# Patient Record
Sex: FEMALE | Race: WHITE | Employment: UNEMPLOYED | URBAN - METROPOLITAN AREA
[De-identification: names, ages, dates, MRNs, and addresses within clinical notes are randomized per-mention and may not be internally consistent; named-entity substitution may affect disease eponyms.]

---

## 2017-02-04 ENCOUNTER — HOSPITAL ENCOUNTER (OUTPATIENT)
Age: 42
Discharge: NURSING, REHAB OR LONG-TERM CARE | End: 2017-02-21
Attending: PHYSICAL MEDICINE & REHABILITATION | Admitting: PHYSICAL MEDICINE & REHABILITATION

## 2017-02-04 DIAGNOSIS — G35 MULTIPLE SCLEROSIS (HCC): ICD-10-CM

## 2017-02-04 DIAGNOSIS — R53.82 CHRONIC FATIGUE: ICD-10-CM

## 2017-02-04 LAB
AMORPH CRY URNS QL MICRO: ABNORMAL
APPEARANCE UR: ABNORMAL
BACTERIA URNS QL MICRO: NEGATIVE /HPF
BILIRUB UR QL: NEGATIVE
COLOR UR: ABNORMAL
EPITH CASTS URNS QL MICRO: ABNORMAL /LPF
GLUCOSE UR STRIP.AUTO-MCNC: NEGATIVE MG/DL
HGB UR QL STRIP: ABNORMAL
KETONES UR QL STRIP.AUTO: NEGATIVE MG/DL
LEUKOCYTE ESTERASE UR QL STRIP.AUTO: NEGATIVE
NITRITE UR QL STRIP.AUTO: NEGATIVE
PH UR STRIP: 7 [PH] (ref 5–8)
PROT UR STRIP-MCNC: NEGATIVE MG/DL
RBC #/AREA URNS HPF: ABNORMAL /HPF (ref 0–5)
SP GR UR REFRACTOMETRY: 1.02 (ref 1–1.03)
UROBILINOGEN UR QL STRIP.AUTO: 2 EU/DL (ref 0.2–1)
WBC URNS QL MICRO: ABNORMAL /HPF (ref 0–4)

## 2017-02-04 PROCEDURE — 87086 URINE CULTURE/COLONY COUNT: CPT | Performed by: PHYSICAL MEDICINE & REHABILITATION

## 2017-02-04 PROCEDURE — 81001 URINALYSIS AUTO W/SCOPE: CPT | Performed by: PHYSICAL MEDICINE & REHABILITATION

## 2017-02-04 PROCEDURE — 74011250637 HC RX REV CODE- 250/637: Performed by: PHYSICAL MEDICINE & REHABILITATION

## 2017-02-04 RX ORDER — BACLOFEN 10 MG/1
10 TABLET ORAL 3 TIMES DAILY
Status: DISCONTINUED | OUTPATIENT
Start: 2017-02-04 | End: 2017-02-10

## 2017-02-04 RX ORDER — ADHESIVE BANDAGE
30 BANDAGE TOPICAL DAILY PRN
Status: DISCONTINUED | OUTPATIENT
Start: 2017-02-04 | End: 2017-02-21 | Stop reason: HOSPADM

## 2017-02-04 RX ORDER — ZOLPIDEM TARTRATE 5 MG/1
10 TABLET ORAL
Status: DISCONTINUED | OUTPATIENT
Start: 2017-02-04 | End: 2017-02-14

## 2017-02-04 RX ORDER — ALPRAZOLAM 0.5 MG/1
3 TABLET ORAL
Status: DISCONTINUED | OUTPATIENT
Start: 2017-02-04 | End: 2017-02-21 | Stop reason: HOSPADM

## 2017-02-04 RX ORDER — ACETAMINOPHEN 325 MG/1
650 TABLET ORAL
Status: DISCONTINUED | OUTPATIENT
Start: 2017-02-04 | End: 2017-02-21 | Stop reason: HOSPADM

## 2017-02-04 RX ORDER — ONDANSETRON 4 MG/1
4 TABLET, ORALLY DISINTEGRATING ORAL
Status: DISCONTINUED | OUTPATIENT
Start: 2017-02-04 | End: 2017-02-21 | Stop reason: HOSPADM

## 2017-02-04 RX ORDER — AMOXICILLIN 250 MG
1 CAPSULE ORAL
Status: DISCONTINUED | OUTPATIENT
Start: 2017-02-04 | End: 2017-02-05 | Stop reason: ALTCHOICE

## 2017-02-04 RX ORDER — DOCUSATE SODIUM 100 MG/1
100 CAPSULE, LIQUID FILLED ORAL 2 TIMES DAILY
Status: DISCONTINUED | OUTPATIENT
Start: 2017-02-05 | End: 2017-02-05 | Stop reason: ALTCHOICE

## 2017-02-04 RX ORDER — OXYCODONE HYDROCHLORIDE 5 MG/1
10 TABLET ORAL
Status: DISCONTINUED | OUTPATIENT
Start: 2017-02-04 | End: 2017-02-06

## 2017-02-04 RX ORDER — ZOLPIDEM TARTRATE 5 MG/1
10 TABLET ORAL
Status: DISCONTINUED | OUTPATIENT
Start: 2017-02-04 | End: 2017-02-04

## 2017-02-04 RX ORDER — POLYETHYLENE GLYCOL 3350 17 G/17G
17 POWDER, FOR SOLUTION ORAL DAILY
Status: DISCONTINUED | OUTPATIENT
Start: 2017-02-05 | End: 2017-02-06 | Stop reason: ALTCHOICE

## 2017-02-04 RX ADMIN — BACLOFEN 10 MG: 10 TABLET ORAL at 22:29

## 2017-02-04 RX ADMIN — ALPRAZOLAM 3 MG: 0.5 TABLET ORAL at 22:30

## 2017-02-04 RX ADMIN — ZOLPIDEM TARTRATE 10 MG: 5 TABLET, FILM COATED ORAL at 22:29

## 2017-02-04 RX ADMIN — OXYCODONE HYDROCHLORIDE 10 MG: 5 TABLET ORAL at 22:29

## 2017-02-05 VITALS — WEIGHT: 164 LBS | BODY MASS INDEX: 25.74 KG/M2 | HEIGHT: 67 IN

## 2017-02-05 LAB
ALBUMIN SERPL BCP-MCNC: 3.3 G/DL (ref 3.5–5)
ALBUMIN/GLOB SERPL: 1.1 {RATIO} (ref 1.1–2.2)
ALP SERPL-CCNC: 87 U/L (ref 45–117)
ALT SERPL-CCNC: 14 U/L (ref 12–78)
ANION GAP BLD CALC-SCNC: 9 MMOL/L (ref 5–15)
AST SERPL W P-5'-P-CCNC: 7 U/L (ref 15–37)
BILIRUB SERPL-MCNC: 0.3 MG/DL (ref 0.2–1)
BUN SERPL-MCNC: 12 MG/DL (ref 6–20)
BUN/CREAT SERPL: 20 (ref 12–20)
CALCIUM SERPL-MCNC: 8.5 MG/DL (ref 8.5–10.1)
CHLORIDE SERPL-SCNC: 108 MMOL/L (ref 97–108)
CO2 SERPL-SCNC: 27 MMOL/L (ref 21–32)
CREAT SERPL-MCNC: 0.6 MG/DL (ref 0.55–1.02)
ERYTHROCYTE [DISTWIDTH] IN BLOOD BY AUTOMATED COUNT: 13.4 % (ref 11.5–14.5)
GLOBULIN SER CALC-MCNC: 3 G/DL (ref 2–4)
GLUCOSE SERPL-MCNC: 89 MG/DL (ref 65–100)
HCT VFR BLD AUTO: 39.5 % (ref 35–47)
HGB BLD-MCNC: 13 G/DL (ref 11.5–16)
MCH RBC QN AUTO: 31.9 PG (ref 26–34)
MCHC RBC AUTO-ENTMCNC: 32.9 G/DL (ref 30–36.5)
MCV RBC AUTO: 97.1 FL (ref 80–99)
PLATELET # BLD AUTO: 212 K/UL (ref 150–400)
POTASSIUM SERPL-SCNC: 3.9 MMOL/L (ref 3.5–5.1)
PROT SERPL-MCNC: 6.3 G/DL (ref 6.4–8.2)
RBC # BLD AUTO: 4.07 M/UL (ref 3.8–5.2)
SODIUM SERPL-SCNC: 144 MMOL/L (ref 136–145)
WBC # BLD AUTO: 8.7 K/UL (ref 3.6–11)

## 2017-02-05 PROCEDURE — 74011250637 HC RX REV CODE- 250/637: Performed by: PHYSICAL MEDICINE & REHABILITATION

## 2017-02-05 PROCEDURE — 36415 COLL VENOUS BLD VENIPUNCTURE: CPT | Performed by: PHYSICAL MEDICINE & REHABILITATION

## 2017-02-05 PROCEDURE — 85027 COMPLETE CBC AUTOMATED: CPT | Performed by: PHYSICAL MEDICINE & REHABILITATION

## 2017-02-05 PROCEDURE — 80053 COMPREHEN METABOLIC PANEL: CPT | Performed by: PHYSICAL MEDICINE & REHABILITATION

## 2017-02-05 RX ORDER — MELATONIN
1000 2 TIMES DAILY
Status: DISCONTINUED | OUTPATIENT
Start: 2017-02-05 | End: 2017-02-21 | Stop reason: HOSPADM

## 2017-02-05 RX ORDER — ALPRAZOLAM 0.25 MG/1
0.25 TABLET ORAL
Status: DISCONTINUED | OUTPATIENT
Start: 2017-02-05 | End: 2017-02-07

## 2017-02-05 RX ORDER — IBUPROFEN 200 MG
1 TABLET ORAL DAILY
Status: DISCONTINUED | OUTPATIENT
Start: 2017-02-05 | End: 2017-02-21 | Stop reason: HOSPADM

## 2017-02-05 RX ORDER — FLUOCINONIDE 0.5 MG/G
CREAM TOPICAL 3 TIMES DAILY
Status: DISCONTINUED | OUTPATIENT
Start: 2017-02-05 | End: 2017-02-15

## 2017-02-05 RX ADMIN — FLUOCINONIDE: 0.5 CREAM TOPICAL at 22:00

## 2017-02-05 RX ADMIN — BACLOFEN 10 MG: 10 TABLET ORAL at 05:46

## 2017-02-05 RX ADMIN — OXYCODONE HYDROCHLORIDE 10 MG: 5 TABLET ORAL at 18:30

## 2017-02-05 RX ADMIN — ALPRAZOLAM 0.25 MG: 0.25 TABLET ORAL at 16:39

## 2017-02-05 RX ADMIN — OXYCODONE HYDROCHLORIDE 10 MG: 5 TABLET ORAL at 11:39

## 2017-02-05 RX ADMIN — BACLOFEN 10 MG: 10 TABLET ORAL at 22:00

## 2017-02-05 RX ADMIN — BACLOFEN 10 MG: 10 TABLET ORAL at 12:53

## 2017-02-05 RX ADMIN — ALPRAZOLAM 3 MG: 0.5 TABLET ORAL at 22:04

## 2017-02-05 RX ADMIN — ZOLPIDEM TARTRATE 10 MG: 5 TABLET, FILM COATED ORAL at 23:39

## 2017-02-05 RX ADMIN — OXYCODONE HYDROCHLORIDE 10 MG: 5 TABLET ORAL at 05:46

## 2017-02-05 RX ADMIN — CHOLECALCIFEROL TAB 25 MCG (1000 UNIT) 1000 UNITS: 25 TAB at 16:40

## 2017-02-05 RX ADMIN — OXYCODONE HYDROCHLORIDE 10 MG: 5 TABLET ORAL at 23:39

## 2017-02-05 RX ADMIN — FLUOCINONIDE: 0.5 CREAM TOPICAL at 16:40

## 2017-02-06 ENCOUNTER — HOSPITAL ENCOUNTER (OUTPATIENT)
Dept: MRI IMAGING | Age: 42
End: 2017-02-06
Attending: PHYSICAL MEDICINE & REHABILITATION
Payer: COMMERCIAL

## 2017-02-06 ENCOUNTER — HOSPITAL ENCOUNTER (OUTPATIENT)
Dept: MRI IMAGING | Age: 42
Discharge: HOME | End: 2017-02-06
Attending: PHYSICAL MEDICINE & REHABILITATION
Payer: COMMERCIAL

## 2017-02-06 LAB
BACTERIA SPEC CULT: NORMAL
CC UR VC: NORMAL
SERVICE CMNT-IMP: NORMAL

## 2017-02-06 PROCEDURE — A9585 GADOBUTROL INJECTION: HCPCS | Performed by: PHYSICAL MEDICINE & REHABILITATION

## 2017-02-06 PROCEDURE — 74011250637 HC RX REV CODE- 250/637: Performed by: PHYSICAL MEDICINE & REHABILITATION

## 2017-02-06 PROCEDURE — 72157 MRI CHEST SPINE W/O & W/DYE: CPT

## 2017-02-06 PROCEDURE — 72156 MRI NECK SPINE W/O & W/DYE: CPT

## 2017-02-06 PROCEDURE — 70553 MRI BRAIN STEM W/O & W/DYE: CPT

## 2017-02-06 PROCEDURE — 74011250636 HC RX REV CODE- 250/636: Performed by: PHYSICAL MEDICINE & REHABILITATION

## 2017-02-06 RX ORDER — OXYCODONE HYDROCHLORIDE 5 MG/1
10 TABLET ORAL
Status: DISCONTINUED | OUTPATIENT
Start: 2017-02-06 | End: 2017-02-21 | Stop reason: HOSPADM

## 2017-02-06 RX ADMIN — CHOLECALCIFEROL TAB 25 MCG (1000 UNIT) 1000 UNITS: 25 TAB at 09:12

## 2017-02-06 RX ADMIN — BACLOFEN 10 MG: 10 TABLET ORAL at 06:10

## 2017-02-06 RX ADMIN — BACLOFEN 10 MG: 10 TABLET ORAL at 21:08

## 2017-02-06 RX ADMIN — FLUOCINONIDE: 0.5 CREAM TOPICAL at 06:10

## 2017-02-06 RX ADMIN — ALPRAZOLAM 0.25 MG: 0.25 TABLET ORAL at 12:29

## 2017-02-06 RX ADMIN — OXYCODONE HYDROCHLORIDE 10 MG: 5 TABLET ORAL at 12:28

## 2017-02-06 RX ADMIN — OXYCODONE HYDROCHLORIDE 10 MG: 5 TABLET ORAL at 17:16

## 2017-02-06 RX ADMIN — GADOBUTROL 10 ML: 604.72 INJECTION INTRAVENOUS at 20:18

## 2017-02-06 RX ADMIN — OXYCODONE HYDROCHLORIDE 10 MG: 5 TABLET ORAL at 22:53

## 2017-02-06 RX ADMIN — BACLOFEN 10 MG: 10 TABLET ORAL at 13:14

## 2017-02-06 RX ADMIN — FLUOCINONIDE: 0.5 CREAM TOPICAL at 13:16

## 2017-02-06 RX ADMIN — CHOLECALCIFEROL TAB 25 MCG (1000 UNIT) 1000 UNITS: 25 TAB at 17:16

## 2017-02-06 RX ADMIN — ALPRAZOLAM 3 MG: 0.5 TABLET ORAL at 21:08

## 2017-02-06 RX ADMIN — FLUOCINONIDE: 0.5 CREAM TOPICAL at 22:00

## 2017-02-06 RX ADMIN — OXYCODONE HYDROCHLORIDE 10 MG: 5 TABLET ORAL at 06:10

## 2017-02-06 RX ADMIN — ZOLPIDEM TARTRATE 10 MG: 5 TABLET, FILM COATED ORAL at 22:00

## 2017-02-06 RX ADMIN — ALPRAZOLAM 0.25 MG: 0.25 TABLET ORAL at 18:53

## 2017-02-06 NOTE — CONSULTS
NEUROLOGY CONSULTATION NOTE     DATE OF CONSULTATION: 2/6/2017    CONSULTED BY: Volodymyr Fonseca MD    Reason for Consult  I have been asked to see the patient in neurological consultation to render advice and opinion regarding multiple sclerosis. HISTORY OF PRESENT ILLNESS  Noemy Gonsalez is a 42 y.o. female who presents to the hospital because of multiple sclerosis. The patient was diagnosed with MS at the age of 31 when she did have bilateral optic neuritis and left sided weakness. The symptoms resolved and were under relatively good control till 2012, w hen she started having increased stumbling and progressive weakness in the legs. She has been on avonex then transitioned to tysabri and for the last 4-5 yrs has been on tecfidera. Tysabri was d/c because of JOON positive status. She does also c/o fatigue. ROS  A ten system review of constitutional, cardiovascular, respiratory, musculoskeletal, endocrine, skin, SHEENT, genitourinary, psychiatric and neurologic systems was obtained and is unremarkable except as stated in HPI     PMH  Past Medical History   Diagnosis Date    Chronic pain     MS (multiple sclerosis) ([facility] [address]        FH  No family history on file. SH  Social History     Social History    Marital status: SINGLE     Spouse name: N/A    Number of children: N/A    Years of education: N/A     Social History Main Topics    Smoking status: Current Every Day Smoker     Packs/day: 0.50     Years: 19.00    Smokeless tobacco: Not on file    Alcohol use No    Drug use: No    Sexual activity: Yes     Partners: Male     Other Topics Concern    Not on file     Social History Narrative    No narrative on file       ALLERGIES  No Known Allergies    PHYSICAL EXAM  EXAMINATION:   No data found. General:   General appearance: Pt is in no acute distress   Distal pulses are preserved  Fundoscopic exam: attempted    Neurological Examination:   Mental Status:  AAO x3. Speech is fluent.  Follows commands, has normal fund of knowledge, attention, short term recall, comprehension and insight. Cranial Nerves: Visual fields are full. PERRL, Extraocular movements are full. Facial sensation intact V1- V3. Facial movement intact, symmetric. Hearing intact to conversation. Palate elevates symmetrically. Shoulder shrug symmetric. Tongue midline. Motor: Strength is 5/5 in UE and 4-/5 in proximal lower ext bilaterally and left foot drop. Normal tone. No atrophy. Sensation: Normal to light touch    Reflexes: DTRs 3+ throughout. Coordination/Cerebellar: Intact to finger-nose-finger     Gait: pt walks with a walker. Skin: No significant bruising or lacerations.     LAB DATA REVIEWED:    Results for orders placed or performed during the hospital encounter of 02/04/17   CULTURE, URINE   Result Value Ref Range    Special Requests: NO SPECIAL REQUESTS      [place] Count <10,000  COLONIES/mL        Culture result: NO SIGNIFICANT GROWTH     URINALYSIS W/MICROSCOPIC   Result Value Ref Range    Color YELLOW/STRAW      Appearance CLOUDY (A) CLEAR      Specific gravity 1.020 1.003 - 1.030      pH (UA) 7.0 5.0 - 8.0      Protein NEGATIVE  NEG mg/dL    Glucose NEGATIVE  NEG mg/dL    Ketone NEGATIVE  NEG mg/dL    Bilirubin NEGATIVE  NEG      Blood TRACE (A) NEG      Urobilinogen 2.0 (H) 0.2 - 1.0 EU/dL    Nitrites NEGATIVE  NEG      Leukocyte Esterase NEGATIVE  NEG      WBC 0-4 0 - 4 /hpf    RBC 5-10 0 - 5 /hpf    Epithelial cells FEW FEW /lpf    Bacteria NEGATIVE  NEG /hpf    Amorphous Crystals 2+ (A) NEG   METABOLIC PANEL, COMPREHENSIVE   Result Value Ref Range    Sodium 144 136 - 145 mmol/L    Potassium 3.9 3.5 - 5.1 mmol/L    Chloride 108 97 - 108 mmol/L    CO2 27 21 - 32 mmol/L    Anion gap 9 5 - 15 mmol/L    Glucose 89 65 - 100 mg/dL    BUN 12 6 - 20 MG/DL    Creatinine 0.60 0.55 - 1.02 MG/DL    BUN/Creatinine ratio 20 12 - 20      GFR est AA >60 >60 ml/min/1.73m2    GFR est non-AA >60 >60 ml/min/1.73m2 Calcium 8.5 8.5 - 10.1 MG/DL    Bilirubin, total 0.3 0.2 - 1.0 MG/DL    ALT (SGPT) 14 12 - 78 U/L    AST (SGOT) 7 (L) 15 - 37 U/L    Alk. phosphatase 87 45 - 117 U/L    Protein, total 6.3 (L) 6.4 - 8.2 g/dL    Albumin 3.3 (L) 3.5 - 5.0 g/dL    Globulin 3.0 2.0 - 4.0 g/dL    A-G Ratio 1.1 1.1 - 2.2     CBC W/O DIFF   Result Value Ref Range    WBC 8.7 3.6 - 11.0 K/uL    RBC 4.07 3.80 - 5.20 M/uL    HGB 13.0 11.5 - 16.0 g/dL    HCT 39.5 35.0 - 47.0 %    MCV 97.1 80.0 - 99.0 FL    MCH 31.9 26.0 - 34.0 PG    MCHC 32.9 30.0 - 36.5 g/dL    RDW 13.4 11.5 - 14.5 %    PLATELET 867 967 - 821 K/uL        Imaging review:  None    HOME MEDS  Prior to Admission Medications   Prescriptions Last Dose Informant Patient Reported? Taking? ALPRAZolam (XANAX) 1 mg tablet   Yes No   Sig: Take 1 mg by mouth nightly. dimethyl fumarate (TECFIDERA) 120 mg cpDR   Yes No   Sig: Take 240 mg by mouth two (2) times a day.   ketorolac (TORADOL) 10 mg tablet   No No   Sig: Take 1 Tab by mouth every six (6) hours as needed for Pain. nitrofurantoin, macrocrystal-monohydrate, (MACROBID) 100 mg capsule   No No   Sig: Take 1 Cap by mouth two (2) times a day. As needed   oxyCODONE IR (ROXICODONE) 5 mg immediate release tablet   Yes No   Sig: Take 10 mg by mouth every four (4) hours as needed. phenazopyridine (PYRIDIUM) 200 mg tablet   No No   Sig: Take 1 Tab by mouth three (3) times daily as needed for Pain. sertraline (ZOLOFT) 50 mg tablet   Yes No   Sig: Take  by mouth daily.       Facility-Administered Medications: None       CURRENT MEDS  Current Facility-Administered Medications   Medication Dose Route Frequency    nicotine (NICODERM CQ) 14 mg/24 hr patch 1 Patch  1 Patch TransDERmal DAILY    cholecalciferol (VITAMIN D3) tablet 1,000 Units  1,000 Units Oral BID    fluocinoNIDE (LIDEX) 0.05 % cream   Topical TID    influenza vaccine 2016-17 (36mos+)(PF) (FLUZONE/FLUARIX/FLULAVAL QUAD) injection 0.5 mL  0.5 mL IntraMUSCular PRIOR TO DISCHARGE    baclofen (LIORESAL) tablet 10 mg  10 mg Oral TID    ALPRAZolam (XANAX) tablet 3 mg  3 mg Oral QHS    polyethylene glycol (MIRALAX) packet 17 g  17 g Oral DAILY    zolpidem (AMBIEN) tablet 10 mg  10 mg Oral QHS       IMPRESSION:  Gavi Richards is a 42 y.o. female who is in rehab because of increased weakness in the legs. She has been on avonex, tysabri and is presently on tecfidera at home. She is not taking tecfidera for a month. She wanted to know other treatment options. RECOMMENDATIONS:  - Will get MRI of the brain, C and T spine with and without contrast to get a new baseline  - I discussed with her about other medications including gilenya, augabio and new meds such as lemtrada. - Cannot start these medications while at rehab and have asked patient to make a follow up appt either at our office or she has the option of going to a MS center such as [facility]    Please call when the MRI is done so that I can review it. Thank you very much for this consultation. We will follow up on the above studies and give further recommendations as indicated.       Shadia Salgado MD  Diplomate, American Board of Psychiatry & Neurology (Neurology)  [place] Board of Psychiatry & Neurology (Clinical Neurophysiology)

## 2017-02-07 PROCEDURE — 74011250637 HC RX REV CODE- 250/637: Performed by: PHYSICAL MEDICINE & REHABILITATION

## 2017-02-07 RX ORDER — ALPRAZOLAM 0.5 MG/1
0.5 TABLET ORAL
Status: DISCONTINUED | OUTPATIENT
Start: 2017-02-07 | End: 2017-02-21 | Stop reason: HOSPADM

## 2017-02-07 RX ADMIN — FLUOCINONIDE: 0.5 CREAM TOPICAL at 21:29

## 2017-02-07 RX ADMIN — CHOLECALCIFEROL TAB 25 MCG (1000 UNIT) 1000 UNITS: 25 TAB at 08:35

## 2017-02-07 RX ADMIN — ALPRAZOLAM 0.25 MG: 0.25 TABLET ORAL at 11:27

## 2017-02-07 RX ADMIN — OXYCODONE HYDROCHLORIDE 10 MG: 5 TABLET ORAL at 05:20

## 2017-02-07 RX ADMIN — BACLOFEN 10 MG: 10 TABLET ORAL at 05:20

## 2017-02-07 RX ADMIN — OXYCODONE HYDROCHLORIDE 10 MG: 5 TABLET ORAL at 11:28

## 2017-02-07 RX ADMIN — ALPRAZOLAM 3 MG: 0.5 TABLET ORAL at 23:19

## 2017-02-07 RX ADMIN — FLUOCINONIDE: 0.5 CREAM TOPICAL at 05:23

## 2017-02-07 RX ADMIN — OXYCODONE HYDROCHLORIDE 10 MG: 5 TABLET ORAL at 17:09

## 2017-02-07 RX ADMIN — OXYCODONE HYDROCHLORIDE 10 MG: 5 TABLET ORAL at 08:41

## 2017-02-07 RX ADMIN — BACLOFEN 10 MG: 10 TABLET ORAL at 21:29

## 2017-02-07 RX ADMIN — OXYCODONE HYDROCHLORIDE 10 MG: 5 TABLET ORAL at 21:26

## 2017-02-07 RX ADMIN — OXYCODONE HYDROCHLORIDE 10 MG: 5 TABLET ORAL at 14:28

## 2017-02-07 RX ADMIN — CHOLECALCIFEROL TAB 25 MCG (1000 UNIT) 1000 UNITS: 25 TAB at 17:09

## 2017-02-07 RX ADMIN — BACLOFEN 10 MG: 10 TABLET ORAL at 14:28

## 2017-02-07 RX ADMIN — FLUOCINONIDE: 0.5 CREAM TOPICAL at 17:10

## 2017-02-07 RX ADMIN — ZOLPIDEM TARTRATE 10 MG: 5 TABLET, FILM COATED ORAL at 23:18

## 2017-02-08 PROCEDURE — 74011250637 HC RX REV CODE- 250/637: Performed by: PHYSICAL MEDICINE & REHABILITATION

## 2017-02-08 PROCEDURE — 36415 COLL VENOUS BLD VENIPUNCTURE: CPT | Performed by: PHYSICAL MEDICINE & REHABILITATION

## 2017-02-08 PROCEDURE — 74011000258 HC RX REV CODE- 258: Performed by: PHYSICAL MEDICINE & REHABILITATION

## 2017-02-08 PROCEDURE — 74011250636 HC RX REV CODE- 250/636: Performed by: PHYSICAL MEDICINE & REHABILITATION

## 2017-02-08 PROCEDURE — 83520 IMMUNOASSAY QUANT NOS NONAB: CPT | Performed by: PHYSICAL MEDICINE & REHABILITATION

## 2017-02-08 RX ORDER — FAMOTIDINE 20 MG/1
20 TABLET, FILM COATED ORAL 2 TIMES DAILY
Status: DISCONTINUED | OUTPATIENT
Start: 2017-02-08 | End: 2017-02-19

## 2017-02-08 RX ORDER — SODIUM CHLORIDE 0.9 % (FLUSH) 0.9 %
5 SYRINGE (ML) INJECTION EVERY 8 HOURS
Status: DISCONTINUED | OUTPATIENT
Start: 2017-02-08 | End: 2017-02-15

## 2017-02-08 RX ORDER — SODIUM CHLORIDE 0.9 % (FLUSH) 0.9 %
10 SYRINGE (ML) INJECTION AS NEEDED
Status: DISCONTINUED | OUTPATIENT
Start: 2017-02-08 | End: 2017-02-15

## 2017-02-08 RX ADMIN — ALPRAZOLAM 0.5 MG: 0.5 TABLET ORAL at 09:01

## 2017-02-08 RX ADMIN — OXYCODONE HYDROCHLORIDE 10 MG: 5 TABLET ORAL at 21:03

## 2017-02-08 RX ADMIN — OXYCODONE HYDROCHLORIDE 10 MG: 5 TABLET ORAL at 16:29

## 2017-02-08 RX ADMIN — BACLOFEN 10 MG: 10 TABLET ORAL at 14:22

## 2017-02-08 RX ADMIN — BACLOFEN 10 MG: 10 TABLET ORAL at 04:28

## 2017-02-08 RX ADMIN — ALPRAZOLAM 0.5 MG: 0.5 TABLET ORAL at 16:49

## 2017-02-08 RX ADMIN — OXYCODONE HYDROCHLORIDE 10 MG: 5 TABLET ORAL at 04:28

## 2017-02-08 RX ADMIN — OXYCODONE HYDROCHLORIDE 10 MG: 5 TABLET ORAL at 08:48

## 2017-02-08 RX ADMIN — CHOLECALCIFEROL TAB 25 MCG (1000 UNIT) 1000 UNITS: 25 TAB at 08:51

## 2017-02-08 RX ADMIN — FAMOTIDINE 20 MG: 20 TABLET ORAL at 19:20

## 2017-02-08 RX ADMIN — Medication 5 ML: at 22:53

## 2017-02-08 RX ADMIN — SODIUM CHLORIDE 1000 MG: 900 INJECTION, SOLUTION INTRAVENOUS at 18:38

## 2017-02-08 RX ADMIN — ZOLPIDEM TARTRATE 10 MG: 5 TABLET, FILM COATED ORAL at 22:52

## 2017-02-08 RX ADMIN — FLUOCINONIDE: 0.5 CREAM TOPICAL at 22:53

## 2017-02-08 RX ADMIN — FLUOCINONIDE: 0.5 CREAM TOPICAL at 04:30

## 2017-02-08 RX ADMIN — BACLOFEN 10 MG: 10 TABLET ORAL at 21:03

## 2017-02-08 RX ADMIN — ALPRAZOLAM 3 MG: 0.5 TABLET ORAL at 22:52

## 2017-02-08 RX ADMIN — OXYCODONE HYDROCHLORIDE 10 MG: 5 TABLET ORAL at 12:25

## 2017-02-08 RX ADMIN — CHOLECALCIFEROL TAB 25 MCG (1000 UNIT) 1000 UNITS: 25 TAB at 16:31

## 2017-02-08 NOTE — H&P
DATE OF POST ADMISSION PHYSICIAN EVALUATION:  02/05/2017 at 08:00 a.m. ADMITTING PHYSICIAN:  Triny Mancia M.D. NEUROLOGY AND PRIMARY CARE:  Sarah Key M.D. REFERRING PHYSICIAN:  Savi Hernandez M.D. REASON FOR INTENSIVE INPATIENT REHABILITATION and PRIMARY DIAGNOSIS:  Multiple sclerosis exacerbation. HISTORY OF PRESENT ILLNESS:  Ms. Jocelin Dickinson is a 54-year-old female with multiple sclerosis, complaining of several falls in the last few months. Most recently, she had a fall last week where she states she hit her head on a dresser. She was at a hotel in a wheelchair-accessible room, but did not have a shower chair and had a hard time transferring out of the shower. She states that she had her last exacerbation in the summer of 2016 and had her latest home health physical therapies in the fall of 2016. She does have a history of relapsing and remitting MS since 2000; however, has noticed worsening strength, balance, and some ataxia. At home, she is able to eat independently, but is not able to stand for a long period of time, she needs to sit in order to dress herself. She has trouble getting in and out of the shower by herself. She is unable to drive. She states she has been told by home health therapist that she needs more intensive rehabilitation and has had trouble making it to outpatient therapies due to lack of transportation. She is admitted to [facility] for intensive inpt rehab    PAST MEDICAL HISTORY:  1. Depression and anxiety. 2. Multiple sclerosis, relapsing and remitting, since 2000 -on tecfidera  3. Chronic MS pain. 4. Endometriosis. PAST SURGICAL HISTORY:  1. Status post colon biopsy. 2. Status post laparoscopy for endometriosis in 2001. 3. Orthopedic right knee procedure. SOCIAL HISTORY:  The patient is currently on disability. She lives with her fiance. She ambulates with a Rollator for the last 2 years.   Prior to that, she used to ambulate with a single-point cane. MEDICATIONS:  1. Baclofen 10 mg 3 times daily. 2. Alprazolam 3 mg orally at bedtime as needed. 3. Ambien extended release 12.5 mg orally at bedtime. 4. Zoloft - the patient not currently taking. 5. Oxycodone 5 mg 2 tablets 4 times daily, prescribed by Dr. Magda Bell. 6. Tecfidera 240 mg twice a day for MS. REVIEW OF SYSTEMS:  EYES:  Positive for blurred vision. CONSTITUTIONAL:  Positive for generalized weakness. IADLs:  Modified independent to minimal assistance. MOBILITY:  Modified independent with Rollator with several falls. ENT:  Denies dysphagia. CARDIOVASCULAR:  Denies chest pain. RESPIRATORY:  Denies shortness of breath. GI:  Denies constipation. :  Bladder, denies urgency. MUSCULOSKELETAL:  Denies joint swelling. SKIN:  Positive for eczema rash. NEUROLOGIC:  Denies spasms. PSYCHIATRIC:  Depressed mood. Not taking Zoloft currently. ENDOCRINE:  Denied cold intolerance. HEME:  Positive for easy bruising. PHYSICAL EXAMINATION:  VITAL SIGNS:  Pulse is 88, blood pressure 110/73, weight 156, height 5 feet 7 inches, and BMI 24.4. GENERAL:  Habitus was mesomorphic and groomed. EYES:  She does have some strabismus of the right eye. NECK:  Normal.  CARDIOVASCULAR:  Normal.  GI:  Abdomen was soft. PSYCHIATRIC:  Occasionally poor memory and perseverative. SKIN:  The patient had a lot of bruising, scarring, and abrasions from falls. NEUROLOGIC:  Sensation was symmetrical in dermatomes around L2, L3, and L4. Reflexes were 3+ at bilateral knees and ankles. Her gait was mildly ataxic with the Rollator, occasionally unable to abduct the left lower extremity and somewhat dragging the right lower extremity. On the left side, hip flexion was 2/5, knee extension was 4/5, ankle plantar flexion 3/5, and dorsiflexion 2/5. On the right side, hip flexion was 4/5, knee extension 5/5, ankle plantar flexion 4/5, and dorsiflexion 4/5.      IMPRESSION:  Functional and mobility deficits progressing and worsening since last multiple sclerosis exacerbation in 11/2016. ASSESSMENT:  1. This patient would benefit from short stay intensive inpatient rehabilitation to address strengthening and balance to maximize independence and to prevent falls. She would most likely benefit from physical therapy, occupational therapy, as well as speech therapy for cognition. 2. For anxiety and depression, the patient is often limited by her mood in terms of function. She may benefit from resuming an antidepressant as well as medical psychology for coping skills and mechanisms. 3. For multiple sclerosis, she is declining  Tecfidera. Consider Neuro consult re other MS disease modifying agents  4. Anxiety with alprazolam as needed. Med Psych eval. Consider other SSRI. Milka Noel Milka Noel Milka Noel Paxil or Lexapro  5. Spasticity with baclofen at home dose. <INITIAL REHABILITATION PLAN OF CARE:>Individual plan of care has been developed in accordance with therapy notes and review of the preadmission assessment. <REHABILITATION POTENTIAL & GOALS:>Rehabilitation potential is good to make overall improvements and achieve functional goal of modified independent. <ESTIMATED LENGTH OF STAY:>2-3wks. <ANTICIPATED DISCHARGE DISPOSITION:>Home with close family support. <PRECAUTIONS AND RESTRICTIONS:>Fall. <POST INPATIENT REHABILITATION PLAN:> OP PT and OT.    <POST INPATIENT MEDICAL FOLLOWUP:>PCP, Neurology, Rehab Medicine. <SPECIFIC REHABILITATION NEEDS:>  1. PT 1 to 2 hours a day, 6 days a week to improve bed mobility, gait, functional transfers, mobility, and strengthening. 2. OT 1 to 2 hours a day, 6 days a week to improve basic self-care and functional transfers, adaptive equipment procurement and training. 3. Nursing 24-hours a day by a nurse specialized in rehabilitation for medication administration, skin infection prevention, wound care, bowel and bladder management, pain control, and vital signs monitoring.   4. Case management 1 hour a day, 3 days a week for discharge planning and team coordination. <POST-ADMISSION PHYSICIAN EVALUATION:>Due to the rigors of the rehabilitation program and methods to monitor, prevent, and treat them are noted in the comorbidities. <COMPARISON TO PREADMISSION SCREENING:>The patient's current function and medical condition remains essentially the same. <SUMMARY STATEMENTS:>An IRF setting is deemed the most appropriate level of care for this patient based upon the needs of the above noted conditions and co-morbidities. 1. This patient requires at least 3 hours daily therapies at least 5 days per week (or a minimum of 15 hours/week) to be provided continuously, increase endurance and reinforce learning of adaptive techniques due to a combination of functional deficits and co-morbidities noted above. 2. In addition, coordinated weekly team meetings will be utilized throughout this patients rehab stay, to review progress, identify problems and determine solutions to these barriers within previously set goals.   Revisions to goals and care plan will be discussed whenever necessary with physician, nursing, and therapy input.             <CC:>      <DICTATION DATE/TIME:> 02/08/2017 07:22:32  <TRANSCRIPTION DATE/TIME:> 02/08/2017 08:10:34

## 2017-02-08 NOTE — PROGRESS NOTES
NEUROLOGY FOLLOW UP NOTE    SUBJECTIVE   - MRI done  - No overnight c/o    HISTORY OF PRESENT ILLNESS  Lacie Claude is a 42 y.o. female who presents to the hospital because of multiple sclerosis. The patient was diagnosed with MS at the age of 31 when she did have bilateral optic neuritis and left sided weakness. The symptoms resolved and were under relatively good control till 2012, w hen she started having increased stumbling and progressive weakness in the legs. She has been on avonex then transitioned to tysabri and for the last 4-5 yrs has been on tecfidera. Tysabri was d/c because of JOON positive status. She does also c/o fatigue. ROS  A ten system review of constitutional, cardiovascular, respiratory, musculoskeletal, endocrine, skin, SHEENT, genitourinary, psychiatric and neurologic systems was obtained and is unremarkable except as stated in HPI     PHYSICAL EXAM  EXAMINATION:   General:   General appearance: Pt is in no acute distress   Distal pulses are preserved    Neurological Examination:   Mental Status:  AAO x3. Speech is fluent. Follows commands, has normal fund of knowledge, attention, short term recall, comprehension and insight. Cranial Nerves: Visual fields are full. PERRL, Extraocular movements are full. Facial sensation intact V1- V3. Facial movement intact, symmetric. Hearing intact to conversation. Palate elevates symmetrically. Shoulder shrug symmetric. Tongue midline. Motor: Strength is 5/5 in UE and 4-/5 in proximal lower ext bilaterally and left foot drop. Normal tone. No atrophy. Sensation: Normal to light touch      Gait: pt walks with a walker. Skin: No significant bruising or lacerations.     LAB DATA REVIEWED:    Results for orders placed or performed during the hospital encounter of 02/04/17   CULTURE, URINE   Result Value Ref Range    Special Requests: NO SPECIAL REQUESTS      [place] Count <10,000  COLONIES/mL        Culture result: NO SIGNIFICANT GROWTH URINALYSIS W/MICROSCOPIC   Result Value Ref Range    Color YELLOW/STRAW      Appearance CLOUDY (A) CLEAR      Specific gravity 1.020 1.003 - 1.030      pH (UA) 7.0 5.0 - 8.0      Protein NEGATIVE  NEG mg/dL    Glucose NEGATIVE  NEG mg/dL    Ketone NEGATIVE  NEG mg/dL    Bilirubin NEGATIVE  NEG      Blood TRACE (A) NEG      Urobilinogen 2.0 (H) 0.2 - 1.0 EU/dL    Nitrites NEGATIVE  NEG      Leukocyte Esterase NEGATIVE  NEG      WBC 0-4 0 - 4 /hpf    RBC 5-10 0 - 5 /hpf    Epithelial cells FEW FEW /lpf    Bacteria NEGATIVE  NEG /hpf    Amorphous Crystals 2+ (A) NEG   METABOLIC PANEL, COMPREHENSIVE   Result Value Ref Range    Sodium 144 136 - 145 mmol/L    Potassium 3.9 3.5 - 5.1 mmol/L    Chloride 108 97 - 108 mmol/L    CO2 27 21 - 32 mmol/L    Anion gap 9 5 - 15 mmol/L    Glucose 89 65 - 100 mg/dL    BUN 12 6 - 20 MG/DL    Creatinine 0.60 0.55 - 1.02 MG/DL    BUN/Creatinine ratio 20 12 - 20      GFR est AA >60 >60 ml/min/1.73m2    GFR est non-AA >60 >60 ml/min/1.73m2    Calcium 8.5 8.5 - 10.1 MG/DL    Bilirubin, total 0.3 0.2 - 1.0 MG/DL    ALT (SGPT) 14 12 - 78 U/L    AST (SGOT) 7 (L) 15 - 37 U/L    Alk. phosphatase 87 45 - 117 U/L    Protein, total 6.3 (L) 6.4 - 8.2 g/dL    Albumin 3.3 (L) 3.5 - 5.0 g/dL    Globulin 3.0 2.0 - 4.0 g/dL    A-G Ratio 1.1 1.1 - 2.2     CBC W/O DIFF   Result Value Ref Range    WBC 8.7 3.6 - 11.0 K/uL    RBC 4.07 3.80 - 5.20 M/uL    HGB 13.0 11.5 - 16.0 g/dL    HCT 39.5 35.0 - 47.0 %    MCV 97.1 80.0 - 99.0 FL    MCH 31.9 26.0 - 34.0 PG    MCHC 32.9 30.0 - 36.5 g/dL    RDW 13.4 11.5 - 14.5 %    PLATELET 858 955 - 794 K/uL        Imaging review:  MRI brain  1. Multiple foci of primarily white matter T2 hyperintensity throughout the  brain in a pattern consistent with clinical history of multiple sclerosis. 2. At least 6 separate foci of associated abnormal enhancement with the largest  in the right frontal white matter. MRI C spine  1.  Multifocal and diffuse areas of T2 hyperintensity throughout the cervical  spine consistent with history of multiple sclerosis. 2. No abnormal intradural enhancement. 3. Disc bulge/protrusions without significant stenosis C4-5, C5-6 and C6-7. MRI T spine  1. Multiple foci of ill-defined T2 hyperintensity throughout the thoracic spinal  cord consistent with clinical history of multiple sclerosis. 2. No abnormal intradural enhancement demonstrated. CURRENT MEDS  Current Facility-Administered Medications   Medication Dose Route Frequency    nicotine (NICODERM CQ) 14 mg/24 hr patch 1 Patch  1 Patch TransDERmal DAILY    cholecalciferol (VITAMIN D3) tablet 1,000 Units  1,000 Units Oral BID    fluocinoNIDE (LIDEX) 0.05 % cream   Topical TID    baclofen (LIORESAL) tablet 10 mg  10 mg Oral TID    ALPRAZolam (XANAX) tablet 3 mg  3 mg Oral QHS    zolpidem (AMBIEN) tablet 10 mg  10 mg Oral QHS       IMPRESSION:  Tiffany Ferguson is a 42 y.o. female who is in rehab because of increased weakness in the legs. She has been on avonex, tysabri and is presently on tecfidera at home. She is not taking tecfidera for a month. She has had MRI of the brain, c and t spine with and without contrast. There is evidence of significant disease burden along with enhancing lesion in her brain.      RECOMMENDATIONS:  - Recommend solumedrol 1 g IV daily for 5 days  - GI prophylaxis  - Continue PT/OT  - Resume tecfidera  - F/U with outpatient neurology      Maite Garner MD  Diplomate, American Board of Psychiatry & Neurology (Neurology)  [place] Alfonso Board of Psychiatry & Neurology (Clinical Neurophysiology)

## 2017-02-09 PROCEDURE — 74011250637 HC RX REV CODE- 250/637: Performed by: PHYSICAL MEDICINE & REHABILITATION

## 2017-02-09 PROCEDURE — 74011250636 HC RX REV CODE- 250/636: Performed by: PHYSICAL MEDICINE & REHABILITATION

## 2017-02-09 PROCEDURE — 74011000258 HC RX REV CODE- 258: Performed by: PHYSICAL MEDICINE & REHABILITATION

## 2017-02-09 RX ORDER — SODIUM CHLORIDE 0.9 % (FLUSH) 0.9 %
10 SYRINGE (ML) INJECTION AS NEEDED
Status: DISCONTINUED | OUTPATIENT
Start: 2017-02-09 | End: 2017-02-09 | Stop reason: SDUPTHER

## 2017-02-09 RX ORDER — DIMETHYL FUMARATE 240 MG/1
240 CAPSULE ORAL 2 TIMES DAILY
Status: DISCONTINUED | OUTPATIENT
Start: 2017-02-09 | End: 2017-02-21 | Stop reason: HOSPADM

## 2017-02-09 RX ORDER — SODIUM CHLORIDE 0.9 % (FLUSH) 0.9 %
5 SYRINGE (ML) INJECTION EVERY 8 HOURS
Status: DISCONTINUED | OUTPATIENT
Start: 2017-02-09 | End: 2017-02-09 | Stop reason: SDUPTHER

## 2017-02-09 RX ADMIN — FLUOCINONIDE: 0.5 CREAM TOPICAL at 06:07

## 2017-02-09 RX ADMIN — BACLOFEN 10 MG: 10 TABLET ORAL at 14:37

## 2017-02-09 RX ADMIN — Medication 5 ML: at 06:05

## 2017-02-09 RX ADMIN — OXYCODONE HYDROCHLORIDE 10 MG: 5 TABLET ORAL at 20:55

## 2017-02-09 RX ADMIN — FLUOCINONIDE: 0.5 CREAM TOPICAL at 23:34

## 2017-02-09 RX ADMIN — FAMOTIDINE 20 MG: 20 TABLET ORAL at 16:40

## 2017-02-09 RX ADMIN — SODIUM CHLORIDE 1000 MG: 900 INJECTION, SOLUTION INTRAVENOUS at 18:18

## 2017-02-09 RX ADMIN — OXYCODONE HYDROCHLORIDE 10 MG: 5 TABLET ORAL at 16:40

## 2017-02-09 RX ADMIN — ZOLPIDEM TARTRATE 10 MG: 5 TABLET, FILM COATED ORAL at 23:29

## 2017-02-09 RX ADMIN — ALPRAZOLAM 3 MG: 0.5 TABLET ORAL at 23:31

## 2017-02-09 RX ADMIN — DIMETHYL FUMARATE 240 MG: 240 CAPSULE ORAL at 20:14

## 2017-02-09 RX ADMIN — BACLOFEN 10 MG: 10 TABLET ORAL at 06:04

## 2017-02-09 RX ADMIN — OXYCODONE HYDROCHLORIDE 10 MG: 5 TABLET ORAL at 03:01

## 2017-02-09 RX ADMIN — ALPRAZOLAM 0.5 MG: 0.5 TABLET ORAL at 12:25

## 2017-02-09 RX ADMIN — FAMOTIDINE 20 MG: 20 TABLET ORAL at 08:19

## 2017-02-09 RX ADMIN — CHOLECALCIFEROL TAB 25 MCG (1000 UNIT) 1000 UNITS: 25 TAB at 08:19

## 2017-02-09 RX ADMIN — BACLOFEN 10 MG: 10 TABLET ORAL at 23:29

## 2017-02-09 RX ADMIN — Medication 5 ML: at 23:29

## 2017-02-09 RX ADMIN — Medication 5 ML: at 14:37

## 2017-02-09 RX ADMIN — OXYCODONE HYDROCHLORIDE 10 MG: 5 TABLET ORAL at 08:18

## 2017-02-09 RX ADMIN — FLUOCINONIDE: 0.5 CREAM TOPICAL at 14:37

## 2017-02-09 RX ADMIN — ALPRAZOLAM 0.5 MG: 0.5 TABLET ORAL at 16:40

## 2017-02-09 RX ADMIN — CHOLECALCIFEROL TAB 25 MCG (1000 UNIT) 1000 UNITS: 25 TAB at 16:40

## 2017-02-09 RX ADMIN — OXYCODONE HYDROCHLORIDE 10 MG: 5 TABLET ORAL at 12:25

## 2017-02-10 LAB — AQP4 H2O CHANNEL AB SERPL IA-ACNC: <1.5 U/ML (ref 0–3)

## 2017-02-10 PROCEDURE — 74011250637 HC RX REV CODE- 250/637: Performed by: PHYSICAL MEDICINE & REHABILITATION

## 2017-02-10 PROCEDURE — 74011250636 HC RX REV CODE- 250/636: Performed by: PHYSICAL MEDICINE & REHABILITATION

## 2017-02-10 PROCEDURE — 74011000258 HC RX REV CODE- 258: Performed by: PHYSICAL MEDICINE & REHABILITATION

## 2017-02-10 RX ORDER — FACIAL-BODY WIPES
10 EACH TOPICAL DAILY PRN
Status: DISCONTINUED | OUTPATIENT
Start: 2017-02-10 | End: 2017-02-21 | Stop reason: HOSPADM

## 2017-02-10 RX ORDER — BACLOFEN 10 MG/1
15 TABLET ORAL 3 TIMES DAILY
Status: DISCONTINUED | OUTPATIENT
Start: 2017-02-10 | End: 2017-02-11

## 2017-02-10 RX ADMIN — FAMOTIDINE 20 MG: 20 TABLET ORAL at 10:33

## 2017-02-10 RX ADMIN — OXYCODONE HYDROCHLORIDE 10 MG: 5 TABLET ORAL at 01:05

## 2017-02-10 RX ADMIN — CHOLECALCIFEROL TAB 25 MCG (1000 UNIT) 1000 UNITS: 25 TAB at 14:33

## 2017-02-10 RX ADMIN — Medication 5 ML: at 05:50

## 2017-02-10 RX ADMIN — OXYCODONE HYDROCHLORIDE 10 MG: 5 TABLET ORAL at 14:32

## 2017-02-10 RX ADMIN — BACLOFEN 15 MG: 10 TABLET ORAL at 23:19

## 2017-02-10 RX ADMIN — ALPRAZOLAM 0.5 MG: 0.5 TABLET ORAL at 10:34

## 2017-02-10 RX ADMIN — FAMOTIDINE 20 MG: 20 TABLET ORAL at 18:50

## 2017-02-10 RX ADMIN — OXYCODONE HYDROCHLORIDE 10 MG: 5 TABLET ORAL at 05:53

## 2017-02-10 RX ADMIN — BACLOFEN 10 MG: 10 TABLET ORAL at 05:50

## 2017-02-10 RX ADMIN — OXYCODONE HYDROCHLORIDE 10 MG: 5 TABLET ORAL at 23:20

## 2017-02-10 RX ADMIN — CHOLECALCIFEROL TAB 25 MCG (1000 UNIT) 1000 UNITS: 25 TAB at 18:51

## 2017-02-10 RX ADMIN — FLUOCINONIDE: 0.5 CREAM TOPICAL at 05:50

## 2017-02-10 RX ADMIN — SODIUM CHLORIDE 1000 MG: 900 INJECTION, SOLUTION INTRAVENOUS at 18:50

## 2017-02-10 RX ADMIN — FLUOCINONIDE: 0.5 CREAM TOPICAL at 14:41

## 2017-02-10 RX ADMIN — DIMETHYL FUMARATE 240 MG: 240 CAPSULE ORAL at 10:34

## 2017-02-10 RX ADMIN — FLUOCINONIDE: 0.5 CREAM TOPICAL at 22:00

## 2017-02-10 RX ADMIN — Medication 5 ML: at 14:34

## 2017-02-10 RX ADMIN — ALPRAZOLAM 0.5 MG: 0.5 TABLET ORAL at 18:50

## 2017-02-10 RX ADMIN — OXYCODONE HYDROCHLORIDE 10 MG: 5 TABLET ORAL at 18:50

## 2017-02-10 RX ADMIN — BACLOFEN 15 MG: 10 TABLET ORAL at 14:33

## 2017-02-10 RX ADMIN — OXYCODONE HYDROCHLORIDE 10 MG: 5 TABLET ORAL at 10:34

## 2017-02-11 PROCEDURE — 74011250636 HC RX REV CODE- 250/636: Performed by: PHYSICAL MEDICINE & REHABILITATION

## 2017-02-11 PROCEDURE — 74011250637 HC RX REV CODE- 250/637: Performed by: PHYSICAL MEDICINE & REHABILITATION

## 2017-02-11 PROCEDURE — 74011000258 HC RX REV CODE- 258: Performed by: PHYSICAL MEDICINE & REHABILITATION

## 2017-02-11 RX ORDER — SERTRALINE HYDROCHLORIDE 50 MG/1
50 TABLET, FILM COATED ORAL DAILY
Status: COMPLETED | OUTPATIENT
Start: 2017-02-11 | End: 2017-02-12

## 2017-02-11 RX ORDER — SERTRALINE HYDROCHLORIDE 50 MG/1
100 TABLET, FILM COATED ORAL DAILY
Status: DISCONTINUED | OUTPATIENT
Start: 2017-02-13 | End: 2017-02-21 | Stop reason: HOSPADM

## 2017-02-11 RX ORDER — BACLOFEN 20 MG/1
20 TABLET ORAL 3 TIMES DAILY
Status: DISCONTINUED | OUTPATIENT
Start: 2017-02-11 | End: 2017-02-12

## 2017-02-11 RX ADMIN — ALPRAZOLAM 0.5 MG: 0.5 TABLET ORAL at 08:52

## 2017-02-11 RX ADMIN — DIMETHYL FUMARATE 240 MG: 240 CAPSULE ORAL at 08:53

## 2017-02-11 RX ADMIN — ALPRAZOLAM 0.5 MG: 0.5 TABLET ORAL at 17:25

## 2017-02-11 RX ADMIN — ALPRAZOLAM 3 MG: 0.5 TABLET ORAL at 21:58

## 2017-02-11 RX ADMIN — OXYCODONE HYDROCHLORIDE 10 MG: 5 TABLET ORAL at 17:25

## 2017-02-11 RX ADMIN — Medication 5 ML: at 01:36

## 2017-02-11 RX ADMIN — CHOLECALCIFEROL TAB 25 MCG (1000 UNIT) 1000 UNITS: 25 TAB at 17:27

## 2017-02-11 RX ADMIN — ALPRAZOLAM 3 MG: 0.5 TABLET ORAL at 01:34

## 2017-02-11 RX ADMIN — Medication 5 ML: at 21:57

## 2017-02-11 RX ADMIN — ZOLPIDEM TARTRATE 10 MG: 5 TABLET, FILM COATED ORAL at 21:57

## 2017-02-11 RX ADMIN — DIMETHYL FUMARATE 240 MG: 240 CAPSULE ORAL at 21:59

## 2017-02-11 RX ADMIN — Medication 5 ML: at 05:33

## 2017-02-11 RX ADMIN — CHOLECALCIFEROL TAB 25 MCG (1000 UNIT) 1000 UNITS: 25 TAB at 08:52

## 2017-02-11 RX ADMIN — FLUOCINONIDE: 0.5 CREAM TOPICAL at 06:00

## 2017-02-11 RX ADMIN — Medication 10 ML: at 17:28

## 2017-02-11 RX ADMIN — SERTRALINE HYDROCHLORIDE 50 MG: 50 TABLET ORAL at 14:20

## 2017-02-11 RX ADMIN — ZOLPIDEM TARTRATE 10 MG: 5 TABLET, FILM COATED ORAL at 01:31

## 2017-02-11 RX ADMIN — SODIUM CHLORIDE 1000 MG: 900 INJECTION, SOLUTION INTRAVENOUS at 17:26

## 2017-02-11 RX ADMIN — OXYCODONE HYDROCHLORIDE 10 MG: 5 TABLET ORAL at 08:52

## 2017-02-11 RX ADMIN — BACLOFEN 20 MG: 20 TABLET ORAL at 14:20

## 2017-02-11 RX ADMIN — DIMETHYL FUMARATE 240 MG: 240 CAPSULE ORAL at 01:31

## 2017-02-11 RX ADMIN — FLUOCINONIDE: 0.5 CREAM TOPICAL at 22:00

## 2017-02-11 RX ADMIN — BACLOFEN 20 MG: 20 TABLET ORAL at 22:00

## 2017-02-11 RX ADMIN — OXYCODONE HYDROCHLORIDE 10 MG: 5 TABLET ORAL at 14:20

## 2017-02-11 RX ADMIN — FLUOCINONIDE: 0.5 CREAM TOPICAL at 14:00

## 2017-02-11 RX ADMIN — BACLOFEN 15 MG: 10 TABLET ORAL at 05:33

## 2017-02-11 RX ADMIN — OXYCODONE HYDROCHLORIDE 10 MG: 5 TABLET ORAL at 05:33

## 2017-02-11 RX ADMIN — OXYCODONE HYDROCHLORIDE 10 MG: 5 TABLET ORAL at 21:57

## 2017-02-11 RX ADMIN — FAMOTIDINE 20 MG: 20 TABLET ORAL at 17:25

## 2017-02-11 RX ADMIN — FAMOTIDINE 20 MG: 20 TABLET ORAL at 08:52

## 2017-02-12 PROCEDURE — 74011000258 HC RX REV CODE- 258: Performed by: PHYSICAL MEDICINE & REHABILITATION

## 2017-02-12 PROCEDURE — 74011250636 HC RX REV CODE- 250/636: Performed by: PHYSICAL MEDICINE & REHABILITATION

## 2017-02-12 PROCEDURE — 74011250637 HC RX REV CODE- 250/637: Performed by: PHYSICAL MEDICINE & REHABILITATION

## 2017-02-12 RX ADMIN — BACLOFEN 30 MG: 20 TABLET ORAL at 21:01

## 2017-02-12 RX ADMIN — FLUOCINONIDE: 0.5 CREAM TOPICAL at 22:00

## 2017-02-12 RX ADMIN — FLUOCINONIDE: 0.5 CREAM TOPICAL at 06:00

## 2017-02-12 RX ADMIN — DIMETHYL FUMARATE 240 MG: 240 CAPSULE ORAL at 21:01

## 2017-02-12 RX ADMIN — SERTRALINE HYDROCHLORIDE 50 MG: 50 TABLET ORAL at 08:54

## 2017-02-12 RX ADMIN — Medication 5 ML: at 13:10

## 2017-02-12 RX ADMIN — ALPRAZOLAM 0.5 MG: 0.5 TABLET ORAL at 16:15

## 2017-02-12 RX ADMIN — OXYCODONE HYDROCHLORIDE 10 MG: 5 TABLET ORAL at 02:55

## 2017-02-12 RX ADMIN — FAMOTIDINE 20 MG: 20 TABLET ORAL at 08:54

## 2017-02-12 RX ADMIN — BACLOFEN 30 MG: 20 TABLET ORAL at 13:10

## 2017-02-12 RX ADMIN — ALPRAZOLAM 3 MG: 0.5 TABLET ORAL at 22:48

## 2017-02-12 RX ADMIN — OXYCODONE HYDROCHLORIDE 10 MG: 5 TABLET ORAL at 21:02

## 2017-02-12 RX ADMIN — CHOLECALCIFEROL TAB 25 MCG (1000 UNIT) 1000 UNITS: 25 TAB at 08:54

## 2017-02-12 RX ADMIN — SODIUM CHLORIDE 1000 MG: 900 INJECTION, SOLUTION INTRAVENOUS at 17:08

## 2017-02-12 RX ADMIN — OXYCODONE HYDROCHLORIDE 10 MG: 5 TABLET ORAL at 16:14

## 2017-02-12 RX ADMIN — CHOLECALCIFEROL TAB 25 MCG (1000 UNIT) 1000 UNITS: 25 TAB at 16:14

## 2017-02-12 RX ADMIN — OXYCODONE HYDROCHLORIDE 10 MG: 5 TABLET ORAL at 06:47

## 2017-02-12 RX ADMIN — BACLOFEN 20 MG: 20 TABLET ORAL at 06:48

## 2017-02-12 RX ADMIN — ZOLPIDEM TARTRATE 10 MG: 5 TABLET, FILM COATED ORAL at 22:48

## 2017-02-12 RX ADMIN — Medication 5 ML: at 21:02

## 2017-02-12 RX ADMIN — OXYCODONE HYDROCHLORIDE 10 MG: 5 TABLET ORAL at 11:59

## 2017-02-12 RX ADMIN — FAMOTIDINE 20 MG: 20 TABLET ORAL at 16:14

## 2017-02-12 RX ADMIN — ALPRAZOLAM 0.5 MG: 0.5 TABLET ORAL at 07:31

## 2017-02-12 RX ADMIN — Medication 10 ML: at 17:12

## 2017-02-12 RX ADMIN — FLUOCINONIDE: 0.5 CREAM TOPICAL at 13:13

## 2017-02-12 RX ADMIN — DIMETHYL FUMARATE 240 MG: 240 CAPSULE ORAL at 08:55

## 2017-02-12 RX ADMIN — Medication 5 ML: at 06:49

## 2017-02-13 PROCEDURE — 74011250637 HC RX REV CODE- 250/637: Performed by: PHYSICAL MEDICINE & REHABILITATION

## 2017-02-13 RX ADMIN — SERTRALINE HYDROCHLORIDE 100 MG: 50 TABLET ORAL at 08:44

## 2017-02-13 RX ADMIN — DIMETHYL FUMARATE 240 MG: 240 CAPSULE ORAL at 08:44

## 2017-02-13 RX ADMIN — CHOLECALCIFEROL TAB 25 MCG (1000 UNIT) 1000 UNITS: 25 TAB at 16:50

## 2017-02-13 RX ADMIN — FLUOCINONIDE: 0.5 CREAM TOPICAL at 12:47

## 2017-02-13 RX ADMIN — ALPRAZOLAM 0.5 MG: 0.5 TABLET ORAL at 04:45

## 2017-02-13 RX ADMIN — Medication 5 ML: at 12:49

## 2017-02-13 RX ADMIN — OXYCODONE HYDROCHLORIDE 10 MG: 5 TABLET ORAL at 12:44

## 2017-02-13 RX ADMIN — BACLOFEN 30 MG: 20 TABLET ORAL at 20:49

## 2017-02-13 RX ADMIN — OXYCODONE HYDROCHLORIDE 10 MG: 5 TABLET ORAL at 08:50

## 2017-02-13 RX ADMIN — FAMOTIDINE 20 MG: 20 TABLET ORAL at 08:44

## 2017-02-13 RX ADMIN — FAMOTIDINE 20 MG: 20 TABLET ORAL at 16:50

## 2017-02-13 RX ADMIN — OXYCODONE HYDROCHLORIDE 10 MG: 5 TABLET ORAL at 04:35

## 2017-02-13 RX ADMIN — ALPRAZOLAM 0.5 MG: 0.5 TABLET ORAL at 16:49

## 2017-02-13 RX ADMIN — OXYCODONE HYDROCHLORIDE 10 MG: 5 TABLET ORAL at 16:50

## 2017-02-13 RX ADMIN — CHOLECALCIFEROL TAB 25 MCG (1000 UNIT) 1000 UNITS: 25 TAB at 08:44

## 2017-02-13 RX ADMIN — BISACODYL 10 MG: 10 SUPPOSITORY RECTAL at 08:54

## 2017-02-13 RX ADMIN — OXYCODONE HYDROCHLORIDE 10 MG: 5 TABLET ORAL at 20:49

## 2017-02-13 RX ADMIN — BACLOFEN 30 MG: 20 TABLET ORAL at 12:58

## 2017-02-13 RX ADMIN — FLUOCINONIDE: 0.5 CREAM TOPICAL at 20:57

## 2017-02-13 RX ADMIN — Medication 5 ML: at 06:24

## 2017-02-13 RX ADMIN — BACLOFEN 30 MG: 20 TABLET ORAL at 06:23

## 2017-02-13 RX ADMIN — DIMETHYL FUMARATE 240 MG: 240 CAPSULE ORAL at 20:50

## 2017-02-14 PROCEDURE — 74011250637 HC RX REV CODE- 250/637: Performed by: PHYSICAL MEDICINE & REHABILITATION

## 2017-02-14 RX ORDER — SIMETHICONE 80 MG
80 TABLET,CHEWABLE ORAL
Status: DISCONTINUED | OUTPATIENT
Start: 2017-02-14 | End: 2017-02-21 | Stop reason: HOSPADM

## 2017-02-14 RX ORDER — BACLOFEN 20 MG/1
20 TABLET ORAL 3 TIMES DAILY
Status: DISCONTINUED | OUTPATIENT
Start: 2017-02-14 | End: 2017-02-21 | Stop reason: HOSPADM

## 2017-02-14 RX ORDER — ZOLPIDEM TARTRATE 5 MG/1
7.5 TABLET ORAL
Status: DISCONTINUED | OUTPATIENT
Start: 2017-02-14 | End: 2017-02-15

## 2017-02-14 RX ADMIN — FLUOCINONIDE: 0.5 CREAM TOPICAL at 21:42

## 2017-02-14 RX ADMIN — BACLOFEN 20 MG: 20 TABLET ORAL at 14:00

## 2017-02-14 RX ADMIN — OXYCODONE HYDROCHLORIDE 10 MG: 5 TABLET ORAL at 06:11

## 2017-02-14 RX ADMIN — SIMETHICONE 80 MG: 80 TABLET, CHEWABLE ORAL at 23:45

## 2017-02-14 RX ADMIN — FAMOTIDINE 20 MG: 20 TABLET ORAL at 18:23

## 2017-02-14 RX ADMIN — ALPRAZOLAM 3 MG: 0.5 TABLET ORAL at 01:13

## 2017-02-14 RX ADMIN — OXYCODONE HYDROCHLORIDE 10 MG: 5 TABLET ORAL at 13:51

## 2017-02-14 RX ADMIN — OXYCODONE HYDROCHLORIDE 10 MG: 5 TABLET ORAL at 09:33

## 2017-02-14 RX ADMIN — DIMETHYL FUMARATE 240 MG: 240 CAPSULE ORAL at 21:40

## 2017-02-14 RX ADMIN — OXYCODONE HYDROCHLORIDE 10 MG: 5 TABLET ORAL at 01:17

## 2017-02-14 RX ADMIN — ALPRAZOLAM 0.5 MG: 0.5 TABLET ORAL at 09:32

## 2017-02-14 RX ADMIN — SERTRALINE HYDROCHLORIDE 100 MG: 50 TABLET ORAL at 09:33

## 2017-02-14 RX ADMIN — OXYCODONE HYDROCHLORIDE 10 MG: 5 TABLET ORAL at 18:22

## 2017-02-14 RX ADMIN — OXYCODONE HYDROCHLORIDE 10 MG: 5 TABLET ORAL at 22:37

## 2017-02-14 RX ADMIN — FAMOTIDINE 20 MG: 20 TABLET ORAL at 09:33

## 2017-02-14 RX ADMIN — ZOLPIDEM TARTRATE 7.5 MG: 5 TABLET, FILM COATED ORAL at 23:45

## 2017-02-14 RX ADMIN — ALPRAZOLAM 3 MG: 0.5 TABLET ORAL at 22:37

## 2017-02-14 RX ADMIN — DIMETHYL FUMARATE 240 MG: 240 CAPSULE ORAL at 09:33

## 2017-02-14 RX ADMIN — CHOLECALCIFEROL TAB 25 MCG (1000 UNIT) 1000 UNITS: 25 TAB at 09:32

## 2017-02-14 RX ADMIN — BACLOFEN 20 MG: 20 TABLET ORAL at 21:39

## 2017-02-14 RX ADMIN — BACLOFEN 30 MG: 20 TABLET ORAL at 06:11

## 2017-02-14 RX ADMIN — ALPRAZOLAM 0.5 MG: 0.5 TABLET ORAL at 18:22

## 2017-02-14 RX ADMIN — CHOLECALCIFEROL TAB 25 MCG (1000 UNIT) 1000 UNITS: 25 TAB at 18:22

## 2017-02-14 RX ADMIN — ZOLPIDEM TARTRATE 10 MG: 5 TABLET, FILM COATED ORAL at 01:14

## 2017-02-15 LAB
APPEARANCE UR: ABNORMAL
BACTERIA URNS QL MICRO: NEGATIVE /HPF
BILIRUB UR QL: NEGATIVE
COLOR UR: ABNORMAL
EPITH CASTS URNS QL MICRO: ABNORMAL /LPF
GLUCOSE UR STRIP.AUTO-MCNC: NEGATIVE MG/DL
HGB UR QL STRIP: NEGATIVE
KETONES UR QL STRIP.AUTO: NEGATIVE MG/DL
LEUKOCYTE ESTERASE UR QL STRIP.AUTO: ABNORMAL
NITRITE UR QL STRIP.AUTO: NEGATIVE
PH UR STRIP: 7 [PH] (ref 5–8)
PROT UR STRIP-MCNC: NEGATIVE MG/DL
RBC #/AREA URNS HPF: ABNORMAL /HPF (ref 0–5)
SP GR UR REFRACTOMETRY: 1.02 (ref 1–1.03)
UROBILINOGEN UR QL STRIP.AUTO: 1 EU/DL (ref 0.2–1)
WBC URNS QL MICRO: ABNORMAL /HPF (ref 0–4)

## 2017-02-15 PROCEDURE — 74011250637 HC RX REV CODE- 250/637: Performed by: PHYSICAL MEDICINE & REHABILITATION

## 2017-02-15 PROCEDURE — 87086 URINE CULTURE/COLONY COUNT: CPT | Performed by: PHYSICAL MEDICINE & REHABILITATION

## 2017-02-15 PROCEDURE — 81001 URINALYSIS AUTO W/SCOPE: CPT | Performed by: PHYSICAL MEDICINE & REHABILITATION

## 2017-02-15 RX ORDER — ZOLPIDEM TARTRATE 5 MG/1
5 TABLET ORAL
Status: DISCONTINUED | OUTPATIENT
Start: 2017-02-15 | End: 2017-02-18

## 2017-02-15 RX ORDER — FLUOCINONIDE 0.5 MG/G
CREAM TOPICAL
Status: DISCONTINUED | OUTPATIENT
Start: 2017-02-15 | End: 2017-02-21 | Stop reason: HOSPADM

## 2017-02-15 RX ADMIN — OXYCODONE HYDROCHLORIDE 10 MG: 5 TABLET ORAL at 10:10

## 2017-02-15 RX ADMIN — BACLOFEN 20 MG: 20 TABLET ORAL at 22:34

## 2017-02-15 RX ADMIN — ALPRAZOLAM 0.5 MG: 0.5 TABLET ORAL at 16:36

## 2017-02-15 RX ADMIN — CHOLECALCIFEROL TAB 25 MCG (1000 UNIT) 1000 UNITS: 25 TAB at 16:36

## 2017-02-15 RX ADMIN — CHOLECALCIFEROL TAB 25 MCG (1000 UNIT) 1000 UNITS: 25 TAB at 08:17

## 2017-02-15 RX ADMIN — SERTRALINE HYDROCHLORIDE 100 MG: 50 TABLET ORAL at 08:17

## 2017-02-15 RX ADMIN — OXYCODONE HYDROCHLORIDE 10 MG: 5 TABLET ORAL at 18:00

## 2017-02-15 RX ADMIN — BACLOFEN 20 MG: 20 TABLET ORAL at 13:59

## 2017-02-15 RX ADMIN — OXYCODONE HYDROCHLORIDE 10 MG: 5 TABLET ORAL at 21:52

## 2017-02-15 RX ADMIN — DIMETHYL FUMARATE 240 MG: 240 CAPSULE ORAL at 22:34

## 2017-02-15 RX ADMIN — BACLOFEN 20 MG: 20 TABLET ORAL at 06:13

## 2017-02-15 RX ADMIN — ZOLPIDEM TARTRATE 5 MG: 5 TABLET, FILM COATED ORAL at 22:35

## 2017-02-15 RX ADMIN — DIMETHYL FUMARATE 240 MG: 240 CAPSULE ORAL at 08:16

## 2017-02-15 RX ADMIN — FAMOTIDINE 20 MG: 20 TABLET ORAL at 16:36

## 2017-02-15 RX ADMIN — FAMOTIDINE 20 MG: 20 TABLET ORAL at 08:17

## 2017-02-15 RX ADMIN — FLUOCINONIDE: 0.5 CREAM TOPICAL at 06:14

## 2017-02-15 RX ADMIN — OXYCODONE HYDROCHLORIDE 10 MG: 5 TABLET ORAL at 06:18

## 2017-02-15 RX ADMIN — OXYCODONE HYDROCHLORIDE 10 MG: 5 TABLET ORAL at 14:10

## 2017-02-15 RX ADMIN — ALPRAZOLAM 3 MG: 0.5 TABLET ORAL at 22:35

## 2017-02-16 PROCEDURE — 74011250637 HC RX REV CODE- 250/637: Performed by: PHYSICAL MEDICINE & REHABILITATION

## 2017-02-16 RX ORDER — OXYBUTYNIN CHLORIDE 5 MG/1
5 TABLET, EXTENDED RELEASE ORAL
Status: DISCONTINUED | OUTPATIENT
Start: 2017-02-16 | End: 2017-02-21 | Stop reason: HOSPADM

## 2017-02-16 RX ADMIN — ALPRAZOLAM 0.5 MG: 0.5 TABLET ORAL at 02:56

## 2017-02-16 RX ADMIN — ALPRAZOLAM 0.5 MG: 0.5 TABLET ORAL at 14:56

## 2017-02-16 RX ADMIN — OXYCODONE HYDROCHLORIDE 10 MG: 5 TABLET ORAL at 22:49

## 2017-02-16 RX ADMIN — ZOLPIDEM TARTRATE 5 MG: 5 TABLET, FILM COATED ORAL at 22:51

## 2017-02-16 RX ADMIN — CHOLECALCIFEROL TAB 25 MCG (1000 UNIT) 1000 UNITS: 25 TAB at 08:43

## 2017-02-16 RX ADMIN — ALPRAZOLAM 3 MG: 0.5 TABLET ORAL at 22:53

## 2017-02-16 RX ADMIN — OXYCODONE HYDROCHLORIDE 10 MG: 5 TABLET ORAL at 11:04

## 2017-02-16 RX ADMIN — BACLOFEN 20 MG: 20 TABLET ORAL at 06:43

## 2017-02-16 RX ADMIN — OXYCODONE HYDROCHLORIDE 10 MG: 5 TABLET ORAL at 18:51

## 2017-02-16 RX ADMIN — OXYCODONE HYDROCHLORIDE 10 MG: 5 TABLET ORAL at 14:56

## 2017-02-16 RX ADMIN — FAMOTIDINE 20 MG: 20 TABLET ORAL at 08:43

## 2017-02-16 RX ADMIN — FAMOTIDINE 20 MG: 20 TABLET ORAL at 18:52

## 2017-02-16 RX ADMIN — BACLOFEN 20 MG: 20 TABLET ORAL at 13:02

## 2017-02-16 RX ADMIN — BACLOFEN 20 MG: 20 TABLET ORAL at 22:50

## 2017-02-16 RX ADMIN — DIMETHYL FUMARATE 240 MG: 240 CAPSULE ORAL at 22:52

## 2017-02-16 RX ADMIN — OXYCODONE HYDROCHLORIDE 10 MG: 5 TABLET ORAL at 06:58

## 2017-02-16 RX ADMIN — DIMETHYL FUMARATE 240 MG: 240 CAPSULE ORAL at 08:43

## 2017-02-16 RX ADMIN — CHOLECALCIFEROL TAB 25 MCG (1000 UNIT) 1000 UNITS: 25 TAB at 18:52

## 2017-02-16 RX ADMIN — OXYBUTYNIN CHLORIDE 5 MG: 5 TABLET, EXTENDED RELEASE ORAL at 22:59

## 2017-02-16 RX ADMIN — OXYCODONE HYDROCHLORIDE 10 MG: 5 TABLET ORAL at 03:02

## 2017-02-16 RX ADMIN — SERTRALINE HYDROCHLORIDE 100 MG: 50 TABLET ORAL at 08:43

## 2017-02-17 ENCOUNTER — APPOINTMENT (OUTPATIENT)
Dept: ULTRASOUND IMAGING | Age: 42
End: 2017-02-17
Attending: PHYSICAL MEDICINE & REHABILITATION

## 2017-02-17 LAB
BACTERIA SPEC CULT: NORMAL
CC UR VC: NORMAL
SERVICE CMNT-IMP: NORMAL

## 2017-02-17 PROCEDURE — 93971 EXTREMITY STUDY: CPT

## 2017-02-17 PROCEDURE — 74011250637 HC RX REV CODE- 250/637: Performed by: PHYSICAL MEDICINE & REHABILITATION

## 2017-02-17 RX ADMIN — OXYBUTYNIN CHLORIDE 5 MG: 5 TABLET, EXTENDED RELEASE ORAL at 22:02

## 2017-02-17 RX ADMIN — BACLOFEN 20 MG: 20 TABLET ORAL at 22:02

## 2017-02-17 RX ADMIN — OXYCODONE HYDROCHLORIDE 10 MG: 5 TABLET ORAL at 13:57

## 2017-02-17 RX ADMIN — FAMOTIDINE 20 MG: 20 TABLET ORAL at 19:17

## 2017-02-17 RX ADMIN — OXYCODONE HYDROCHLORIDE 10 MG: 5 TABLET ORAL at 18:57

## 2017-02-17 RX ADMIN — FAMOTIDINE 20 MG: 20 TABLET ORAL at 10:40

## 2017-02-17 RX ADMIN — OXYCODONE HYDROCHLORIDE 10 MG: 5 TABLET ORAL at 06:43

## 2017-02-17 RX ADMIN — BACLOFEN 20 MG: 20 TABLET ORAL at 06:42

## 2017-02-17 RX ADMIN — CHOLECALCIFEROL TAB 25 MCG (1000 UNIT) 1000 UNITS: 25 TAB at 10:39

## 2017-02-17 RX ADMIN — ALPRAZOLAM 0.5 MG: 0.5 TABLET ORAL at 13:57

## 2017-02-17 RX ADMIN — CHOLECALCIFEROL TAB 25 MCG (1000 UNIT) 1000 UNITS: 25 TAB at 19:17

## 2017-02-17 RX ADMIN — DIMETHYL FUMARATE 240 MG: 240 CAPSULE ORAL at 10:39

## 2017-02-17 RX ADMIN — OXYCODONE HYDROCHLORIDE 10 MG: 5 TABLET ORAL at 10:30

## 2017-02-17 RX ADMIN — DIMETHYL FUMARATE 240 MG: 240 CAPSULE ORAL at 22:02

## 2017-02-17 RX ADMIN — OXYCODONE HYDROCHLORIDE 10 MG: 5 TABLET ORAL at 02:51

## 2017-02-17 RX ADMIN — OXYCODONE HYDROCHLORIDE 10 MG: 5 TABLET ORAL at 21:59

## 2017-02-17 RX ADMIN — ALPRAZOLAM 0.5 MG: 0.5 TABLET ORAL at 19:25

## 2017-02-17 RX ADMIN — BACLOFEN 20 MG: 20 TABLET ORAL at 13:28

## 2017-02-17 RX ADMIN — SERTRALINE HYDROCHLORIDE 100 MG: 50 TABLET ORAL at 10:39

## 2017-02-18 PROCEDURE — 74011250637 HC RX REV CODE- 250/637: Performed by: PHYSICAL MEDICINE & REHABILITATION

## 2017-02-18 RX ORDER — ZOLPIDEM TARTRATE 5 MG/1
2.5 TABLET ORAL
Status: DISCONTINUED | OUTPATIENT
Start: 2017-02-18 | End: 2017-02-19

## 2017-02-18 RX ADMIN — OXYCODONE HYDROCHLORIDE 10 MG: 5 TABLET ORAL at 01:33

## 2017-02-18 RX ADMIN — OXYCODONE HYDROCHLORIDE 10 MG: 5 TABLET ORAL at 16:28

## 2017-02-18 RX ADMIN — OXYCODONE HYDROCHLORIDE 10 MG: 5 TABLET ORAL at 20:55

## 2017-02-18 RX ADMIN — CHOLECALCIFEROL TAB 25 MCG (1000 UNIT) 1000 UNITS: 25 TAB at 09:08

## 2017-02-18 RX ADMIN — DIMETHYL FUMARATE 240 MG: 240 CAPSULE ORAL at 20:54

## 2017-02-18 RX ADMIN — ZOLPIDEM TARTRATE 2.5 MG: 5 TABLET, FILM COATED ORAL at 23:46

## 2017-02-18 RX ADMIN — FAMOTIDINE 20 MG: 20 TABLET ORAL at 09:08

## 2017-02-18 RX ADMIN — CHOLECALCIFEROL TAB 25 MCG (1000 UNIT) 1000 UNITS: 25 TAB at 16:27

## 2017-02-18 RX ADMIN — OXYCODONE HYDROCHLORIDE 10 MG: 5 TABLET ORAL at 09:13

## 2017-02-18 RX ADMIN — ALPRAZOLAM 0.5 MG: 0.5 TABLET ORAL at 11:47

## 2017-02-18 RX ADMIN — OXYCODONE HYDROCHLORIDE 10 MG: 5 TABLET ORAL at 05:10

## 2017-02-18 RX ADMIN — BACLOFEN 20 MG: 20 TABLET ORAL at 20:54

## 2017-02-18 RX ADMIN — DIMETHYL FUMARATE 240 MG: 240 CAPSULE ORAL at 09:08

## 2017-02-18 RX ADMIN — BACLOFEN 20 MG: 20 TABLET ORAL at 13:09

## 2017-02-18 RX ADMIN — OXYBUTYNIN CHLORIDE 5 MG: 5 TABLET, EXTENDED RELEASE ORAL at 20:54

## 2017-02-18 RX ADMIN — MAGNESIUM HYDROXIDE 30 ML: 400 SUSPENSION ORAL at 16:28

## 2017-02-18 RX ADMIN — ALPRAZOLAM 3 MG: 0.5 TABLET ORAL at 23:46

## 2017-02-18 RX ADMIN — OXYCODONE HYDROCHLORIDE 10 MG: 5 TABLET ORAL at 13:00

## 2017-02-18 RX ADMIN — BACLOFEN 20 MG: 20 TABLET ORAL at 05:07

## 2017-02-18 RX ADMIN — SERTRALINE HYDROCHLORIDE 100 MG: 50 TABLET ORAL at 09:08

## 2017-02-18 RX ADMIN — ALPRAZOLAM 3 MG: 0.5 TABLET ORAL at 01:33

## 2017-02-18 RX ADMIN — FAMOTIDINE 20 MG: 20 TABLET ORAL at 16:28

## 2017-02-19 PROCEDURE — 90686 IIV4 VACC NO PRSV 0.5 ML IM: CPT | Performed by: PHYSICAL MEDICINE & REHABILITATION

## 2017-02-19 PROCEDURE — 74011250637 HC RX REV CODE- 250/637: Performed by: PHYSICAL MEDICINE & REHABILITATION

## 2017-02-19 PROCEDURE — 74011250636 HC RX REV CODE- 250/636: Performed by: PHYSICAL MEDICINE & REHABILITATION

## 2017-02-19 RX ADMIN — OXYCODONE HYDROCHLORIDE 10 MG: 5 TABLET ORAL at 14:30

## 2017-02-19 RX ADMIN — SERTRALINE HYDROCHLORIDE 100 MG: 50 TABLET ORAL at 08:43

## 2017-02-19 RX ADMIN — DIMETHYL FUMARATE 240 MG: 240 CAPSULE ORAL at 21:46

## 2017-02-19 RX ADMIN — OXYBUTYNIN CHLORIDE 5 MG: 5 TABLET, EXTENDED RELEASE ORAL at 21:43

## 2017-02-19 RX ADMIN — BACLOFEN 20 MG: 20 TABLET ORAL at 13:32

## 2017-02-19 RX ADMIN — ALPRAZOLAM 3 MG: 0.5 TABLET ORAL at 23:28

## 2017-02-19 RX ADMIN — CHOLECALCIFEROL TAB 25 MCG (1000 UNIT) 1000 UNITS: 25 TAB at 08:43

## 2017-02-19 RX ADMIN — CHOLECALCIFEROL TAB 25 MCG (1000 UNIT) 1000 UNITS: 25 TAB at 16:40

## 2017-02-19 RX ADMIN — DIMETHYL FUMARATE 240 MG: 240 CAPSULE ORAL at 08:45

## 2017-02-19 RX ADMIN — OXYCODONE HYDROCHLORIDE 10 MG: 5 TABLET ORAL at 06:52

## 2017-02-19 RX ADMIN — BACLOFEN 20 MG: 20 TABLET ORAL at 21:43

## 2017-02-19 RX ADMIN — OXYCODONE HYDROCHLORIDE 10 MG: 5 TABLET ORAL at 10:55

## 2017-02-19 RX ADMIN — BACLOFEN 20 MG: 20 TABLET ORAL at 06:52

## 2017-02-19 RX ADMIN — OXYCODONE HYDROCHLORIDE 10 MG: 5 TABLET ORAL at 18:04

## 2017-02-19 RX ADMIN — INFLUENZA VIRUS VACCINE 0.5 ML: 15; 15; 15; 15 SUSPENSION INTRAMUSCULAR at 16:40

## 2017-02-19 RX ADMIN — OXYCODONE HYDROCHLORIDE 10 MG: 5 TABLET ORAL at 21:43

## 2017-02-19 RX ADMIN — OXYCODONE HYDROCHLORIDE 10 MG: 5 TABLET ORAL at 02:10

## 2017-02-19 RX ADMIN — ALPRAZOLAM 0.5 MG: 0.5 TABLET ORAL at 15:24

## 2017-02-20 PROCEDURE — 74011250637 HC RX REV CODE- 250/637: Performed by: PHYSICAL MEDICINE & REHABILITATION

## 2017-02-20 RX ADMIN — DIMETHYL FUMARATE 240 MG: 240 CAPSULE ORAL at 08:57

## 2017-02-20 RX ADMIN — OXYCODONE HYDROCHLORIDE 10 MG: 5 TABLET ORAL at 01:50

## 2017-02-20 RX ADMIN — BACLOFEN 20 MG: 20 TABLET ORAL at 21:30

## 2017-02-20 RX ADMIN — OXYCODONE HYDROCHLORIDE 10 MG: 5 TABLET ORAL at 21:28

## 2017-02-20 RX ADMIN — OXYCODONE HYDROCHLORIDE 10 MG: 5 TABLET ORAL at 17:21

## 2017-02-20 RX ADMIN — OXYCODONE HYDROCHLORIDE 10 MG: 5 TABLET ORAL at 08:57

## 2017-02-20 RX ADMIN — OXYCODONE HYDROCHLORIDE 10 MG: 5 TABLET ORAL at 05:48

## 2017-02-20 RX ADMIN — OXYBUTYNIN CHLORIDE 5 MG: 5 TABLET, EXTENDED RELEASE ORAL at 21:30

## 2017-02-20 RX ADMIN — CHOLECALCIFEROL TAB 25 MCG (1000 UNIT) 1000 UNITS: 25 TAB at 08:57

## 2017-02-20 RX ADMIN — SERTRALINE HYDROCHLORIDE 100 MG: 50 TABLET ORAL at 08:57

## 2017-02-20 RX ADMIN — BACLOFEN 20 MG: 20 TABLET ORAL at 05:49

## 2017-02-20 RX ADMIN — CHOLECALCIFEROL TAB 25 MCG (1000 UNIT) 1000 UNITS: 25 TAB at 17:16

## 2017-02-20 RX ADMIN — ALPRAZOLAM 0.5 MG: 0.5 TABLET ORAL at 11:34

## 2017-02-20 RX ADMIN — OXYCODONE HYDROCHLORIDE 10 MG: 5 TABLET ORAL at 12:57

## 2017-02-20 RX ADMIN — BACLOFEN 20 MG: 20 TABLET ORAL at 13:00

## 2017-02-20 RX ADMIN — DIMETHYL FUMARATE 240 MG: 240 CAPSULE ORAL at 21:30

## 2017-02-21 PROCEDURE — 74011250637 HC RX REV CODE- 250/637: Performed by: PHYSICAL MEDICINE & REHABILITATION

## 2017-02-21 RX ADMIN — DIMETHYL FUMARATE 240 MG: 240 CAPSULE ORAL at 08:55

## 2017-02-21 RX ADMIN — ALPRAZOLAM 3 MG: 0.5 TABLET ORAL at 00:11

## 2017-02-21 RX ADMIN — BACLOFEN 20 MG: 20 TABLET ORAL at 05:06

## 2017-02-21 RX ADMIN — OXYCODONE HYDROCHLORIDE 10 MG: 5 TABLET ORAL at 08:55

## 2017-02-21 RX ADMIN — OXYCODONE HYDROCHLORIDE 10 MG: 5 TABLET ORAL at 11:40

## 2017-02-21 RX ADMIN — SERTRALINE HYDROCHLORIDE 100 MG: 50 TABLET ORAL at 08:55

## 2017-02-21 RX ADMIN — OXYCODONE HYDROCHLORIDE 10 MG: 5 TABLET ORAL at 02:55

## 2017-02-21 RX ADMIN — CHOLECALCIFEROL TAB 25 MCG (1000 UNIT) 1000 UNITS: 25 TAB at 08:55
